# Patient Record
Sex: FEMALE | URBAN - NONMETROPOLITAN AREA
[De-identification: names, ages, dates, MRNs, and addresses within clinical notes are randomized per-mention and may not be internally consistent; named-entity substitution may affect disease eponyms.]

---

## 2024-04-18 ENCOUNTER — APPOINTMENT (OUTPATIENT)
Dept: URBAN - NONMETROPOLITAN AREA SURGERY 8 | Age: 79
Setting detail: DERMATOLOGY
End: 2024-04-22

## 2024-04-18 DIAGNOSIS — L30.9 DERMATITIS, UNSPECIFIED: ICD-10-CM

## 2024-04-18 DIAGNOSIS — L71.8 OTHER ROSACEA: ICD-10-CM

## 2024-04-18 PROCEDURE — OTHER ADDITIONAL NOTES: OTHER

## 2024-04-18 PROCEDURE — 99204 OFFICE O/P NEW MOD 45 MIN: CPT

## 2024-04-18 PROCEDURE — OTHER PRESCRIPTION: OTHER

## 2024-04-18 PROCEDURE — OTHER PRESCRIPTION MEDICATION MANAGEMENT: OTHER

## 2024-04-18 PROCEDURE — OTHER COUNSELING: OTHER

## 2024-04-18 PROCEDURE — OTHER MIPS QUALITY: OTHER

## 2024-04-18 PROCEDURE — OTHER DEFER: OTHER

## 2024-04-18 RX ORDER — TRIAMCINOLONE ACETONIDE 1 MG/G
CREAM TOPICAL BID
Qty: 454 | Refills: 3 | Status: ACTIVE

## 2024-04-18 ASSESSMENT — LOCATION DETAILED DESCRIPTION DERM: LOCATION DETAILED: NASAL DORSUM

## 2024-04-18 ASSESSMENT — LOCATION SIMPLE DESCRIPTION DERM: LOCATION SIMPLE: NOSE

## 2024-04-18 ASSESSMENT — LOCATION ZONE DERM: LOCATION ZONE: NOSE

## 2024-04-18 NOTE — PROCEDURE: MIPS QUALITY
Quality 110: Preventive Care And Screening: Influenza Immunization: Influenza Immunization previously received during influenza season
Quality 226: Preventive Care And Screening: Tobacco Use: Screening And Cessation Intervention: Patient screened for tobacco use and is an ex/non-smoker
Additional Notes: *** Meds documented to the best of my ability with the resources available
Quality 111:Pneumonia Vaccination Status For Older Adults: Pneumococcal vaccine (PPSV23) administered on or after patient’s 60th birthday and before the end of the measurement period
Quality 431: Preventive Care And Screening: Unhealthy Alcohol Use - Screening: Patient not identified as an unhealthy alcohol user when screened for unhealthy alcohol use using a systematic screening method
Quality 130: Documentation Of Current Medications In The Medical Record: Current Medications Documented
Detail Level: Detailed

## 2024-04-18 NOTE — PROCEDURE: ADDITIONAL NOTES
Additional Notes: We recommend patient to use triple cream and Oxymetazoline, patient would like to wait. She will call to let us know.
Detail Level: Simple
Render Risk Assessment In Note?: no

## 2024-04-18 NOTE — PROCEDURE: DEFER
Detail Level: Detailed
Size Of Lesion In Cm (Optional): 0
Introduction Text (Please End With A Colon): we will biopsy this area at next visit. photo taken

## 2024-04-18 NOTE — PROCEDURE: COUNSELING
Sunscreen Recommendations: Facial Sunscreens as noted above. Sunscreen should be applied daily and then q 80 minutes to exposed areas. There are many different sunscreen products on the market and may be found in a cream/spray/lotion/stick/powder. Use whatever product that feels good on your skin.
Ipl Recommendations: Intense Pulse LIght or Broad Band Light can be used as needed to manage background erythema. The frequency of treatment will depend on desired goals. these light therapies need to be repeated over time. the light therapies are often not covered by insurance but may be covered by some FSA or HSA plans
Cleanser Recommendations: Cerave Gentle Cleanser\\nCerave Foaming Cleanser\\nIf instructed, a benzoyl peroxide wash or salacylic acid wash can be purchased over the counter
Topical Retinoids Recommendations: For some with rosacea, retinoid based products may be irritating and to be used with caution. Mechanical scrubs and exfoliants may be irritating to the skin and should be used with caution.
Moisturizer Recommendations: AM Use: Cerave AM, Cetaphil Oil Control or any other oil-free facial moisturizer with SPF 30+. Sunscreen should be applied each and every day, regardless of the weather or activity level\\nPM Use: Cerave PM can be applied over topical medications (wait 15 minutes between applications)
Detail Level: Detailed
Laser Recommendations: Vascular lasers that target background redness or telangectasias may be recommended. These lasers are often not covered by insurance but may be covered by some FSA or HSA plans .\\nOther types of laser therapies may be used to manage textural changes of the skin, due to rosacea.